# Patient Record
Sex: MALE | Race: OTHER | Employment: FULL TIME | URBAN - METROPOLITAN AREA
[De-identification: names, ages, dates, MRNs, and addresses within clinical notes are randomized per-mention and may not be internally consistent; named-entity substitution may affect disease eponyms.]

---

## 2017-02-27 ENCOUNTER — OFFICE VISIT (OUTPATIENT)
Dept: NEUROLOGY | Facility: CLINIC | Age: 47
End: 2017-02-27

## 2017-02-27 VITALS
DIASTOLIC BLOOD PRESSURE: 82 MMHG | HEIGHT: 69 IN | SYSTOLIC BLOOD PRESSURE: 130 MMHG | BODY MASS INDEX: 31.4 KG/M2 | RESPIRATION RATE: 16 BRPM | WEIGHT: 212 LBS

## 2017-02-27 DIAGNOSIS — D32.9 MENINGIOMA (HCC): ICD-10-CM

## 2017-02-27 DIAGNOSIS — I63.411 CEREBROVASCULAR ACCIDENT (CVA) DUE TO EMBOLISM OF RIGHT MIDDLE CEREBRAL ARTERY (HCC): Primary | ICD-10-CM

## 2017-02-27 PROCEDURE — 99213 OFFICE O/P EST LOW 20 MIN: CPT | Performed by: OTHER

## 2017-02-27 NOTE — PATIENT INSTRUCTIONS
Refill policies:    • Allow 2 business days for refills; controlled substances may take longer. • Contact our office at least 5 days prior to running out of medication or submit request through the “request refill” option in your [employer] account.   • Ref have a procedure or additional testing performed. [facility]) will contact your insurance carrier to obtain pre-certification or prior authorization.     Unfortunately, JERONIMO has seen an increase in denial of payment even though the p

## 2017-02-27 NOTE — PROGRESS NOTES
[address] [facility] : 1970     HPI:   Patient presents with:  TIA: Pt in for follow-up last seen on 2016. Pt denies complaints, has been stable. Adelso JennyMarcellShore was seen for follow-up in my office 2017.   He is a 46-y APPROACH  04/30/2012    Comment scanned to media tab      Family History   Problem Relation Age of Onset   • Cancer Father      Per NG:  Lung - cancer; Cause of death   • Diabetes Maternal Aunt       Social History:  Social History    Marital Status: Svetlana corneal reflex. VII- face symmetric. VIII- Auditory acuity symmetric. Motor: 5/5 strength in the upper and lower extremities. Tone normal. No pronator drift .   DTR: 2+ in the upper and lower extremities,  No Babinski, no hoffmans, no clonus  Coordinatio

## 2017-04-06 ENCOUNTER — LAB ENCOUNTER (OUTPATIENT)
Dept: LAB | Facility: HOSPITAL | Age: 47
End: 2017-04-06
Attending: INTERNAL MEDICINE
Payer: COMMERCIAL

## 2017-04-06 DIAGNOSIS — I10 HYPERTENSION: Primary | ICD-10-CM

## 2017-04-06 DIAGNOSIS — I48.91 ATRIAL FIBRILLATION (HCC): ICD-10-CM

## 2017-04-06 PROCEDURE — 84450 TRANSFERASE (AST) (SGOT): CPT

## 2017-04-06 PROCEDURE — 36415 COLL VENOUS BLD VENIPUNCTURE: CPT

## 2017-04-06 PROCEDURE — 80061 LIPID PANEL: CPT

## 2017-04-06 PROCEDURE — 84460 ALANINE AMINO (ALT) (SGPT): CPT

## 2017-04-06 PROCEDURE — 80048 BASIC METABOLIC PNL TOTAL CA: CPT

## 2017-04-12 ENCOUNTER — HOSPITAL ENCOUNTER (OUTPATIENT)
Dept: CV DIAGNOSTICS | Facility: HOSPITAL | Age: 47
Discharge: HOME | End: 2017-04-12
Attending: INTERNAL MEDICINE
Payer: COMMERCIAL

## 2017-04-12 DIAGNOSIS — I63.119 CEREBROVASCULAR ACCIDENT (CVA) DUE TO EMBOLISM OF VERTEBRAL ARTERY, UNSPECIFIED BLOOD VESSEL LATERALITY (HCC): ICD-10-CM

## 2017-04-12 DIAGNOSIS — I48.19 PERSISTENT ATRIAL FIBRILLATION (HCC): Chronic | ICD-10-CM

## 2017-04-12 PROCEDURE — 93306 TTE W/DOPPLER COMPLETE: CPT

## 2017-04-12 PROCEDURE — 93306 TTE W/DOPPLER COMPLETE: CPT | Performed by: INTERNAL MEDICINE

## 2017-05-17 ENCOUNTER — OFFICE VISIT (OUTPATIENT)
Dept: CARDIOLOGY CLINIC | Facility: CLINIC | Age: 47
End: 2017-05-17

## 2017-05-17 VITALS
SYSTOLIC BLOOD PRESSURE: 150 MMHG | HEART RATE: 94 BPM | HEIGHT: 69 IN | WEIGHT: 220 LBS | BODY MASS INDEX: 32.58 KG/M2 | RESPIRATION RATE: 18 BRPM | DIASTOLIC BLOOD PRESSURE: 109 MMHG

## 2017-05-17 DIAGNOSIS — I63.119 CEREBROVASCULAR ACCIDENT (CVA) DUE TO EMBOLISM OF VERTEBRAL ARTERY, UNSPECIFIED BLOOD VESSEL LATERALITY (HCC): ICD-10-CM

## 2017-05-17 DIAGNOSIS — I48.19 PERSISTENT ATRIAL FIBRILLATION (HCC): Primary | Chronic | ICD-10-CM

## 2017-05-17 DIAGNOSIS — I10 ESSENTIAL HYPERTENSION: Chronic | ICD-10-CM

## 2017-05-17 PROCEDURE — 99214 OFFICE O/P EST MOD 30 MIN: CPT | Performed by: INTERNAL MEDICINE

## 2017-05-17 PROCEDURE — 99212 OFFICE O/P EST SF 10 MIN: CPT | Performed by: INTERNAL MEDICINE

## 2017-05-17 RX ORDER — METOPROLOL SUCCINATE 100 MG/1
100 TABLET, EXTENDED RELEASE ORAL DAILY
Qty: 30 TABLET | Refills: 5 | Status: SHIPPED | OUTPATIENT
Start: 2017-05-17 | End: 2018-01-24

## 2017-05-17 NOTE — PATIENT INSTRUCTIONS
Increase Toprol-XL from 50 mg to 100 mg once a day. Continue rest of the medications. Hold atorvastatin for 2 weeks, if relief in joint pains call my office for a substitute medication.   If no change in joint pains after 2 weeks restart atorvastatin at 4

## 2017-05-17 NOTE — PROGRESS NOTES
Titi Joyner is a 40year old male. Patient presents with: Follow - Up    HPI:   Patient is here for a follow-up appointment.   He has history of chronic atrial fibrillation, CVA and LV dysfunction in the past which is recovered to 50% on the last Pulse 94  Resp 18  Ht 5' 9\" (1.753 m)  Wt 220 lb (99.791 kg)  BMI 32.47 kg/m2  GENERAL: well developed, well nourished,in no apparent distress  SKIN: no rashes,no suspicious lesions  HEENT: atraumatic, normocephalic,ears and throat are clear  NECK: supple

## 2017-05-26 ENCOUNTER — APPOINTMENT (OUTPATIENT)
Dept: LAB | Age: 47
End: 2017-05-26
Attending: FAMILY MEDICINE
Payer: COMMERCIAL

## 2017-05-26 ENCOUNTER — OFFICE VISIT (OUTPATIENT)
Dept: FAMILY MEDICINE CLINIC | Facility: CLINIC | Age: 47
End: 2017-05-26

## 2017-05-26 VITALS
BODY MASS INDEX: 32.76 KG/M2 | DIASTOLIC BLOOD PRESSURE: 85 MMHG | RESPIRATION RATE: 18 BRPM | WEIGHT: 221.19 LBS | SYSTOLIC BLOOD PRESSURE: 124 MMHG | TEMPERATURE: 98 F | HEART RATE: 79 BPM | HEIGHT: 69 IN

## 2017-05-26 DIAGNOSIS — R36.1 HEMATOSPERMIA: ICD-10-CM

## 2017-05-26 DIAGNOSIS — M99.02 THORACIC REGION SOMATIC DYSFUNCTION: ICD-10-CM

## 2017-05-26 DIAGNOSIS — T50.905A MEDICATION SIDE EFFECT, INITIAL ENCOUNTER: ICD-10-CM

## 2017-05-26 DIAGNOSIS — E78.5 HYPERLIPIDEMIA, UNSPECIFIED HYPERLIPIDEMIA TYPE: ICD-10-CM

## 2017-05-26 DIAGNOSIS — I48.19 PERSISTENT ATRIAL FIBRILLATION (HCC): Primary | Chronic | ICD-10-CM

## 2017-05-26 PROCEDURE — 98927 OSTEOPATH MANJ 5-6 REGIONS: CPT | Performed by: FAMILY MEDICINE

## 2017-05-26 PROCEDURE — 87491 CHLMYD TRACH DNA AMP PROBE: CPT

## 2017-05-26 PROCEDURE — 87086 URINE CULTURE/COLONY COUNT: CPT

## 2017-05-26 PROCEDURE — 99214 OFFICE O/P EST MOD 30 MIN: CPT | Performed by: FAMILY MEDICINE

## 2017-05-26 PROCEDURE — 81003 URINALYSIS AUTO W/O SCOPE: CPT

## 2017-05-26 PROCEDURE — 87591 N.GONORRHOEAE DNA AMP PROB: CPT

## 2017-05-26 RX ORDER — DOXYCYCLINE HYCLATE 100 MG/1
100 CAPSULE ORAL 2 TIMES DAILY
Qty: 14 CAPSULE | Refills: 0 | Status: SHIPPED | OUTPATIENT
Start: 2017-05-26 | End: 2017-06-02

## 2017-05-26 NOTE — PROGRESS NOTES
HPI:    Patient ID: Sandra Srivastava is a 40year old male. Chest Pain   This is a new problem. The current episode started more than 1 month ago. The onset quality is gradual. The problem occurs intermittently.  The problem has been waxing and wan Allergies:No Known Allergies     05/26/17  1310   BP: 124/85   Pulse: 79   Temp: 97.9 °F (36.6 °C)   TempSrc: Oral   Resp: 18   Height: 5' 9\" (1.753 m)   Weight: 221 lb 3.2 oz (100.336 kg)         Body mass index is 32.65 kg/(m^2).       PHYSICAL EXAM:   P Empiric treatment and assessment for UTI and STD with doxycycline. Patient counseled on the importance of abstinence and if sex occurs of any type condoms should be used every single time.  The reality of unwanted pregnancy and all STD including HIV were em

## 2017-05-26 NOTE — PATIENT INSTRUCTIONS
Empiric treatment and assessment for UTI and STD with doxycycline. Patient counseled on the importance of abstinence and if sex occurs of any type condoms should be used every single time.  The reality of unwanted pregnancy and all STD including HIV were em

## 2017-06-08 ENCOUNTER — TELEPHONE (OUTPATIENT)
Dept: CARDIOLOGY CLINIC | Facility: CLINIC | Age: 47
End: 2017-06-08

## 2017-06-08 NOTE — TELEPHONE ENCOUNTER
Metoprolol Succinate  MG Oral Tablet 24 Hr, Take 1 tablet (100 mg total) by mouth daily. , Disp: 30 tablet, Rfl: 5

## 2017-08-05 RX ORDER — METOPROLOL SUCCINATE 100 MG/1
100 TABLET, EXTENDED RELEASE ORAL DAILY
Qty: 30 TABLET | Refills: 5 | Status: CANCELLED
Start: 2017-08-05

## 2017-08-07 NOTE — TELEPHONE ENCOUNTER
From: Milagros May  Sent: 8/5/2017 4:37 PM CDT  Subject: Medication Renewal Request    Adelso Perez would like a refill of the following medications:  Metoprolol Succinate  MG Oral Tablet 24 Hr Ophelia Alamo MD]    Preferred ph

## 2017-08-30 NOTE — TELEPHONE ENCOUNTER
From: Jasmine Goff  Sent: 8/29/2017 8:29 PM CDT  Subject: Medication Renewal Request    Adelso Jones would like a refill of the following medications:  apixaban (ELIQUIS) 5 MG Oral Tab Chon Black MD]    Preferred pharmacy: Other -

## 2017-09-13 ENCOUNTER — OFFICE VISIT (OUTPATIENT)
Dept: NEUROLOGY | Facility: CLINIC | Age: 47
End: 2017-09-13

## 2017-09-13 ENCOUNTER — TELEPHONE (OUTPATIENT)
Dept: NEUROLOGY | Facility: CLINIC | Age: 47
End: 2017-09-13

## 2017-09-13 VITALS
HEART RATE: 68 BPM | DIASTOLIC BLOOD PRESSURE: 80 MMHG | WEIGHT: 218 LBS | SYSTOLIC BLOOD PRESSURE: 120 MMHG | HEIGHT: 69 IN | BODY MASS INDEX: 32.29 KG/M2 | RESPIRATION RATE: 16 BRPM

## 2017-09-13 DIAGNOSIS — I63.411 CEREBROVASCULAR ACCIDENT (CVA) DUE TO EMBOLISM OF RIGHT MIDDLE CEREBRAL ARTERY (HCC): Primary | ICD-10-CM

## 2017-09-13 DIAGNOSIS — M54.81 BILATERAL OCCIPITAL NEURALGIA: ICD-10-CM

## 2017-09-13 DIAGNOSIS — D32.9 MENINGIOMA (HCC): ICD-10-CM

## 2017-09-13 PROCEDURE — 99213 OFFICE O/P EST LOW 20 MIN: CPT | Performed by: OTHER

## 2017-09-13 NOTE — TELEPHONE ENCOUNTER
[employer] for authorization of approval for MRI brain w/wo. Approval was given with Authorization # [id number] effective 09/13/17 to 10/12/17. Will call Pt. to inform. L/m advising of MRI approval. Can proceed with scheduling appt.

## 2017-09-13 NOTE — PATIENT INSTRUCTIONS
As of October 6th 2014, the Drug Enforcement Agency [facility]) is reclassifying all hydrocodone combination medications from Schedule III to Schedule II. This includes medications such as Norco, Vicodin, Lortab, Zohydro, and Vicoprofen.     What this means for y

## 2017-09-13 NOTE — PROGRESS NOTES
Adelso Shore : 1970     HPI:   Patient presents with:  Neurologic Problem: LOV 17 for follow up after right cerebral infarct 2016.  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 No rash or blisters over his scalp. CV: No  Evidence of Carotid Bruits. Pulse is irregular.   Neuro:  Higher Integrative Functions:  Alert and cooperative, with normal

## 2017-09-28 ENCOUNTER — HOSPITAL ENCOUNTER (OUTPATIENT)
Dept: MRI IMAGING | Facility: HOSPITAL | Age: 47
Discharge: HOME | End: 2017-09-28
Attending: Other
Payer: COMMERCIAL

## 2017-09-28 DIAGNOSIS — I63.411 CEREBROVASCULAR ACCIDENT (CVA) DUE TO EMBOLISM OF RIGHT MIDDLE CEREBRAL ARTERY (HCC): ICD-10-CM

## 2017-09-28 DIAGNOSIS — D32.9 MENINGIOMA (HCC): ICD-10-CM

## 2017-09-28 DIAGNOSIS — M54.81 BILATERAL OCCIPITAL NEURALGIA: ICD-10-CM

## 2017-09-28 PROCEDURE — 80048 BASIC METABOLIC PNL TOTAL CA: CPT

## 2017-09-28 PROCEDURE — 36415 COLL VENOUS BLD VENIPUNCTURE: CPT

## 2017-09-28 PROCEDURE — A9575 INJ GADOTERATE MEGLUMI 0.1ML: HCPCS | Performed by: OTHER

## 2017-09-28 PROCEDURE — 85652 RBC SED RATE AUTOMATED: CPT

## 2017-09-28 PROCEDURE — 70553 MRI BRAIN STEM W/O & W/DYE: CPT | Performed by: OTHER

## 2017-09-28 PROCEDURE — 82565 ASSAY OF CREATININE: CPT

## 2017-10-05 ENCOUNTER — TELEPHONE (OUTPATIENT)
Dept: NEUROLOGY | Facility: CLINIC | Age: 47
End: 2017-10-05

## 2017-11-02 ENCOUNTER — PATIENT MESSAGE (OUTPATIENT)
Dept: CARDIOLOGY CLINIC | Facility: CLINIC | Age: 47
End: 2017-11-02

## 2017-11-02 RX ORDER — DILTIAZEM HYDROCHLORIDE 240 MG/1
240 CAPSULE, COATED, EXTENDED RELEASE ORAL
Qty: 90 CAPSULE | Refills: 0 | Status: SHIPPED | OUTPATIENT
Start: 2017-11-02 | End: 2017-11-14

## 2017-11-02 NOTE — TELEPHONE ENCOUNTER
From: Fadia Can  To: Edil Car MD  Sent: 11/2/2017 3:01 PM CDT  Subject: Prescription Question    Hi, the medication that I need refill is DilTIAZem HCl ER Coated Beads 240 MG Cp24.  Thanks    Adelso

## 2017-11-02 NOTE — TELEPHONE ENCOUNTER
From: Litzy Cheung  To: Fern Borges MD  Sent: 11/2/2017 12:17 AM CDT  Subject: Prescription Question    I am running out of any refill for the \"DilTIAZem HCl ER Coated Beads 240 MG Cp24\", could you please approved the refill for this med

## 2017-11-02 NOTE — TELEPHONE ENCOUNTER
From: [address]  Sent: 11/2/2017 12:22 AM CDT  Subject: Medication Renewal Request    Adelso Bañuelos would like a refill of the following medications:     apixaban (ELIQUIS) 5 MG Oral Tab Trace Oates MD]   Patient Comment: I am

## 2017-11-15 RX ORDER — DILTIAZEM HYDROCHLORIDE 240 MG/1
240 CAPSULE, COATED, EXTENDED RELEASE ORAL
Qty: 90 CAPSULE | Refills: 0 | Status: SHIPPED
Start: 2017-11-15 | End: 2018-01-24

## 2017-11-15 NOTE — TELEPHONE ENCOUNTER
From: Adelso Shore  Sent: 11/14/2017 6:25 PM CST  Subject: Medication Renewal Request    Adelso Henderson would like a refill of the following medications:     DilTIAZem HCl ER Coated Beads 240 MG Oral Capsule SR 24 Hr NICKIE Frankel]    Preferred pharmacy: [facility] , [facility] [phone], [phone]

## 2017-11-16 ENCOUNTER — OFFICE VISIT (OUTPATIENT)
Dept: FAMILY MEDICINE CLINIC | Facility: CLINIC | Age: 47
End: 2017-11-16

## 2017-11-16 VITALS
HEART RATE: 76 BPM | BODY MASS INDEX: 33.33 KG/M2 | SYSTOLIC BLOOD PRESSURE: 112 MMHG | TEMPERATURE: 99 F | RESPIRATION RATE: 16 BRPM | DIASTOLIC BLOOD PRESSURE: 90 MMHG | HEIGHT: 69 IN | WEIGHT: 225 LBS

## 2017-11-16 DIAGNOSIS — K21.9 GASTROESOPHAGEAL REFLUX DISEASE, ESOPHAGITIS PRESENCE NOT SPECIFIED: Primary | ICD-10-CM

## 2017-11-16 DIAGNOSIS — E87.5 HYPERKALEMIA: ICD-10-CM

## 2017-11-16 DIAGNOSIS — I63.9 CEREBROVASCULAR ACCIDENT (CVA), UNSPECIFIED MECHANISM (HCC): ICD-10-CM

## 2017-11-16 PROCEDURE — 99212 OFFICE O/P EST SF 10 MIN: CPT | Performed by: FAMILY MEDICINE

## 2017-11-16 PROCEDURE — 99214 OFFICE O/P EST MOD 30 MIN: CPT | Performed by: FAMILY MEDICINE

## 2017-11-16 NOTE — PROGRESS NOTES
HPI:    Patient ID: Naomi El is a 56year old male. 56year old [place] male with recent history of atrial fibrillation which lead to a CVA without residual effect.  Has recently been re-evaluated by neurology because of head sensations (leigh 76    Resp: 16    Temp: 98.6 °F (37 °C)    TempSrc: Oral    Weight: 225 lb (102.1 kg)    Height: 5' 9\" (1.753 m)          Body mass index is 33.23 kg/m².     PHYSICAL EXAM:   Physical Exam    Constitutional: He is oriented to person, place, and time and ob

## 2017-11-21 ENCOUNTER — APPOINTMENT (OUTPATIENT)
Dept: LAB | Facility: HOSPITAL | Age: 47
End: 2017-11-21
Attending: FAMILY MEDICINE
Payer: COMMERCIAL

## 2017-11-21 DIAGNOSIS — E87.5 HYPERKALEMIA: ICD-10-CM

## 2017-11-21 PROCEDURE — 80048 BASIC METABOLIC PNL TOTAL CA: CPT

## 2017-11-21 PROCEDURE — 36415 COLL VENOUS BLD VENIPUNCTURE: CPT

## 2018-01-25 RX ORDER — DILTIAZEM HYDROCHLORIDE 240 MG/1
240 CAPSULE, COATED, EXTENDED RELEASE ORAL
Qty: 90 CAPSULE | Refills: 0 | Status: SHIPPED
Start: 2018-01-25 | End: 2018-04-18

## 2018-01-25 RX ORDER — METOPROLOL SUCCINATE 100 MG/1
100 TABLET, EXTENDED RELEASE ORAL DAILY
Qty: 30 TABLET | Refills: 2 | Status: SHIPPED
Start: 2018-01-25 | End: 2018-04-18

## 2018-01-25 NOTE — TELEPHONE ENCOUNTER
From: Adelso Shore  Sent: 1/24/2018 6:38 PM CST  Subject: Medication Renewal Request    Adelso Amanda  would like a refill of the following medications:     apixaban (ELIQUIS) 5 MG Oral Tab NICKIE Barr]    Preferred pharmac

## 2018-01-25 NOTE — TELEPHONE ENCOUNTER
Rx request for Eliquis 5 mg, UNABLE to fill per protocol. Please review. Thank you.       Refill Protocol Appointment Criteria  · Appointment scheduled in the past 6 months or in the next 3 months  Recent Outpatient Visits            2 months ago [facility]. [facility] Lutz

## 2018-01-25 NOTE — TELEPHONE ENCOUNTER
From: Adelso Shore  Sent: 1/24/2018 6:38 PM CST  Subject: Medication Renewal Request    Adelso Velazquez would like a refill of the following medications:     Metoprolol Succinate  MG Oral Tablet 24 Hr Chiara Sam MD]     ElaineT

## 2018-01-25 NOTE — TELEPHONE ENCOUNTER
Rx request for Metoprolol Succinate  mg and Diltiazem  mg, filled per protoocol.     Hypertensive Medications  Protocol Criteria:  · Appointment scheduled in the past 6 months or in the next 3 months  · BMP or CMP in the past 12 months  · Creat Ramos [phone] 11/21/2017

## 2018-01-26 ENCOUNTER — TELEPHONE (OUTPATIENT)
Dept: GASTROENTEROLOGY | Facility: CLINIC | Age: 48
End: 2018-01-26

## 2018-01-26 ENCOUNTER — OFFICE VISIT (OUTPATIENT)
Dept: GASTROENTEROLOGY | Facility: CLINIC | Age: 48
End: 2018-01-26

## 2018-01-26 VITALS
SYSTOLIC BLOOD PRESSURE: 138 MMHG | HEART RATE: 58 BPM | BODY MASS INDEX: 33.57 KG/M2 | WEIGHT: 226.63 LBS | HEIGHT: 69 IN | DIASTOLIC BLOOD PRESSURE: 84 MMHG

## 2018-01-26 DIAGNOSIS — R10.13 DYSPEPSIA: ICD-10-CM

## 2018-01-26 DIAGNOSIS — I48.19 PERSISTENT ATRIAL FIBRILLATION (HCC): Primary | Chronic | ICD-10-CM

## 2018-01-26 DIAGNOSIS — K21.9 GASTROESOPHAGEAL REFLUX DISEASE WITHOUT ESOPHAGITIS: ICD-10-CM

## 2018-01-26 PROCEDURE — 99244 OFF/OP CNSLTJ NEW/EST MOD 40: CPT | Performed by: INTERNAL MEDICINE

## 2018-01-26 PROCEDURE — 99212 OFFICE O/P EST SF 10 MIN: CPT | Performed by: INTERNAL MEDICINE

## 2018-01-26 NOTE — H&P
[address] [facility] [address] Gastroenterology                                                                                                  Clinic History and Physical     Pa of Onset   • Cancer Father      Per NG:  Lung - cancer; Cause of death   • Diabetes Maternal Aunt       Social History: Smoking status: Never Smoker                                                              Smokeless tobacco: Never Used perfused   Lung- Moves air well;  No labored breathing  Abdomen- soft, non-tender exam in all quadrants without rigidity or guarding, non-distended, no abnormal bowel sounds noted, no masses are palpated  Skin- No jaundice  Ext: no cyanosis, clubbing or paris

## 2018-01-26 NOTE — TELEPHONE ENCOUNTER
Scheduled for:  EGD 01932  Provider Name: Dr. Anastacia Rizvi  Date:  2/26/18  Location:  [facility]  Sedation:  MAC  Time:  1:15 pm, (pt is aware that [facility] will call the day before to confirm arrival time)  Prep: Clear liquids after midnight, NPO 3 hours prior to procedure

## 2018-01-26 NOTE — PATIENT INSTRUCTIONS
1. Schedule EGD with MAC (in afternoon)   -Continue ALL medications for procedure, including your blood thinner  2. Continue tums as needed  3.  Avoid laying down for 3 hours after dinner

## 2018-02-21 RX ORDER — APIXABAN 5 MG/1
TABLET, FILM COATED ORAL
Qty: 60 TABLET | Refills: 0 | Status: SHIPPED | OUTPATIENT
Start: 2018-02-21 | End: 2018-03-22

## 2018-02-21 NOTE — TELEPHONE ENCOUNTER
Please advise on refill request.  Follow up scheduled 5/2      Recent Outpatient Visits            3 weeks ago Persistent atrial fibrillation [facility])    Anna Oleary MD    Office Visit    3 months ago [facility] Valent[facility] [employer]

## 2018-02-22 ENCOUNTER — TELEPHONE (OUTPATIENT)
Dept: GASTROENTEROLOGY | Facility: CLINIC | Age: 48
End: 2018-02-22

## 2018-02-22 NOTE — TELEPHONE ENCOUNTER
According to pt's appt desk in \"past appointments tab\", pt called on 2/1/18 to cancel EGD procedure that was scheduled for 2/26/18 and did not want to reschedule.      Emailed [phone] informing them of cancellation and pt had already been removed from proced

## 2018-02-26 ENCOUNTER — TELEPHONE (OUTPATIENT)
Dept: CARDIOLOGY CLINIC | Facility: CLINIC | Age: 48
End: 2018-02-26

## 2018-02-26 NOTE — TELEPHONE ENCOUNTER
Noted, appt is scheduled   Future Appointments  Date Time Provider Sagar Rinaldi   5/2/2018 8:30 AM Allen Gonzalez MD [facility] Cari     Pt advised to call back when he needs a refill   Refill was just sent on 2/21

## 2018-02-26 NOTE — TELEPHONE ENCOUNTER
Pt states per pharmacy he will not receive refill for Rx ELIQUIS 5 MG Oral Tab next month until appt has been made. Pt  Has appt scheduled with PP on 5/2/18.  Please call thank you [phone]          Current Outpatient Prescriptions:   •  ELIQUIS 5 MG Or

## 2018-03-25 RX ORDER — APIXABAN 5 MG/1
TABLET, FILM COATED ORAL
Qty: 60 TABLET | Refills: 0 | Status: SHIPPED | OUTPATIENT
Start: 2018-03-25 | End: 2018-04-18

## 2018-04-18 NOTE — TELEPHONE ENCOUNTER
From: Adelso Shore  Sent: 4/18/2018 4:16 PM CDT  Subject: Medication Renewal Request    Adelso Casanovaute Teixeira would like a refill of the following medications:     Metoprolol Succinate  MG Oral Tablet 24 Hr Shanel Rueda MD]   Kacey

## 2018-04-18 NOTE — TELEPHONE ENCOUNTER
From: Adelso Shore  Sent: 4/18/2018 4:16 PM CDT  Subject: Medication Renewal Request    Adelso Willett would like a refill of the following medications:     ELIQUIS 5 MG Oral Tab NICKIE Mcgowan]   Patient Comment: My appointme

## 2018-04-22 RX ORDER — DILTIAZEM HYDROCHLORIDE 240 MG/1
240 CAPSULE, COATED, EXTENDED RELEASE ORAL
Qty: 30 CAPSULE | Refills: 0 | Status: SHIPPED
Start: 2018-04-22 | End: 2018-05-02

## 2018-04-22 RX ORDER — METOPROLOL SUCCINATE 100 MG/1
100 TABLET, EXTENDED RELEASE ORAL DAILY
Qty: 30 TABLET | Refills: 0 | Status: SHIPPED
Start: 2018-04-22 | End: 2018-05-02

## 2018-04-26 ENCOUNTER — PATIENT MESSAGE (OUTPATIENT)
Dept: CARDIOLOGY CLINIC | Facility: CLINIC | Age: 48
End: 2018-04-26

## 2018-04-26 NOTE — TELEPHONE ENCOUNTER
From: Lizzeth Earl  To: Ara Ramírez MD  Sent: 4/26/2018 12:09 AM CDT  Subject: Prescription Question    I have the doctor appointment on May 2, i requested to get a refill for Eliquis, [facility] pharmacy said it was denied.  Please approved refil

## 2018-05-02 ENCOUNTER — OFFICE VISIT (OUTPATIENT)
Dept: CARDIOLOGY CLINIC | Facility: CLINIC | Age: 48
End: 2018-05-02

## 2018-05-02 VITALS
HEART RATE: 56 BPM | DIASTOLIC BLOOD PRESSURE: 76 MMHG | SYSTOLIC BLOOD PRESSURE: 124 MMHG | BODY MASS INDEX: 33 KG/M2 | WEIGHT: 224 LBS | RESPIRATION RATE: 16 BRPM

## 2018-05-02 DIAGNOSIS — I63.119 CEREBROVASCULAR ACCIDENT (CVA) DUE TO EMBOLISM OF VERTEBRAL ARTERY, UNSPECIFIED BLOOD VESSEL LATERALITY (HCC): ICD-10-CM

## 2018-05-02 DIAGNOSIS — I10 ESSENTIAL HYPERTENSION: Chronic | ICD-10-CM

## 2018-05-02 DIAGNOSIS — I48.0 PAROXYSMAL ATRIAL FIBRILLATION (HCC): Primary | Chronic | ICD-10-CM

## 2018-05-02 PROCEDURE — 99212 OFFICE O/P EST SF 10 MIN: CPT | Performed by: INTERNAL MEDICINE

## 2018-05-02 PROCEDURE — 99214 OFFICE O/P EST MOD 30 MIN: CPT | Performed by: INTERNAL MEDICINE

## 2018-05-02 RX ORDER — METOPROLOL SUCCINATE 100 MG/1
100 TABLET, EXTENDED RELEASE ORAL DAILY
Qty: 90 TABLET | Refills: 3 | Status: SHIPPED | OUTPATIENT
Start: 2018-05-02 | End: 2018-05-30

## 2018-05-02 RX ORDER — ATORVASTATIN CALCIUM 40 MG/1
40 TABLET, FILM COATED ORAL
Refills: 0 | Status: CANCELLED | OUTPATIENT
Start: 2018-05-02

## 2018-05-02 RX ORDER — ROSUVASTATIN CALCIUM 20 MG/1
10 TABLET, COATED ORAL NIGHTLY
Qty: 30 TABLET | Refills: 5 | Status: SHIPPED | OUTPATIENT
Start: 2018-05-02 | End: 2018-05-30

## 2018-05-02 RX ORDER — DILTIAZEM HYDROCHLORIDE 240 MG/1
240 CAPSULE, COATED, EXTENDED RELEASE ORAL
Qty: 90 CAPSULE | Refills: 3 | Status: SHIPPED | OUTPATIENT
Start: 2018-05-02 | End: 2018-05-30

## 2018-05-02 NOTE — PROGRESS NOTES
Mini Juarez is a 56year old male. Patient presents with: Follow - Up: Wt. gain, Feels like brain is itching    HPI:   Patient is here for follow-up appointment.   He has history of atrial fibrillation and CVA in the past.  He denies any sympt well developed, well nourished,in no apparent distress  SKIN: no rashes,no suspicious lesions  HEENT: atraumatic, normocephalic,ears and throat are clear  NECK: supple,no adenopathy,no bruits  LUNGS: clear to auscultation  CARDIO: regular rate and rhythm

## 2018-05-02 NOTE — PATIENT INSTRUCTIONS
Start taking Crestor 5 mg every other day for a month and if no symptoms after the month start taking it every day. Continue rest of the medications. Blood test within the next few weeks. Follow-up with me in 12 months or sooner if needed.

## 2018-05-16 ENCOUNTER — HOSPITAL ENCOUNTER (OUTPATIENT)
Age: 48
Discharge: HOME | End: 2018-05-16
Attending: PEDIATRICS
Payer: COMMERCIAL

## 2018-05-16 VITALS
TEMPERATURE: 98 F | DIASTOLIC BLOOD PRESSURE: 86 MMHG | RESPIRATION RATE: 16 BRPM | HEART RATE: 96 BPM | SYSTOLIC BLOOD PRESSURE: 158 MMHG

## 2018-05-16 DIAGNOSIS — M76.62 ACHILLES TENDONITIS, BILATERAL: Primary | ICD-10-CM

## 2018-05-16 DIAGNOSIS — M76.61 ACHILLES TENDONITIS, BILATERAL: Primary | ICD-10-CM

## 2018-05-16 PROCEDURE — 99214 OFFICE O/P EST MOD 30 MIN: CPT

## 2018-05-16 PROCEDURE — 99213 OFFICE O/P EST LOW 20 MIN: CPT

## 2018-05-16 RX ORDER — PREDNISONE 20 MG/1
40 TABLET ORAL DAILY
Qty: 6 TABLET | Refills: 0 | Status: SHIPPED | OUTPATIENT
Start: 2018-05-16 | End: 2018-05-19

## 2018-05-16 NOTE — ED PROVIDER NOTES
Patient Seen in: [facility] In [address] [place] [address]    History   Patient presents with:  Lower Extremity Injury (musculoskeletal)    Stated Complaint: Nhan Leg Pain    HPI    HPI: Suze Costa is a 56year old male who presents with CHOLECYSTECTOMY  2007: COLECTOMY  2007: COLECTOMY  04/30/2012: MEDIASTINOSCOPY, CERV APPROACH      Comment: scanned to media tab    Medications :   predniSONE 20 MG Oral Tab,  Take 2 tablets (40 mg total) by mouth daily.    DilTIAZem HCl ER Coated Beads 240 There is no ligamentous instability to anterior drawer. There is no notable deformity. Mild warmth and erythema noted, no skin breakdown. No proximal tib fib tenderness. The foot and toes are warm and well-perfused. NEURO:Sensation to touch is intact.

## 2018-05-16 NOTE — ED INITIAL ASSESSMENT (HPI)
c/o pain and swelling both feet since Sunday. Pain starts from both ankles. Claims that Lt foot swelling is almost resolved  Denies trauma.

## 2018-05-28 RX ORDER — METOPROLOL SUCCINATE 100 MG/1
100 TABLET, EXTENDED RELEASE ORAL DAILY
Qty: 90 TABLET | Refills: 3
Start: 2018-05-28

## 2018-05-28 RX ORDER — DILTIAZEM HYDROCHLORIDE 240 MG/1
240 CAPSULE, COATED, EXTENDED RELEASE ORAL
Qty: 90 CAPSULE | Refills: 3
Start: 2018-05-28

## 2018-05-28 RX ORDER — ROSUVASTATIN CALCIUM 20 MG/1
10 TABLET, COATED ORAL NIGHTLY
Qty: 30 TABLET | Refills: 5
Start: 2018-05-28

## 2018-05-30 NOTE — TELEPHONE ENCOUNTER
From: Adelso Shore  Sent: 5/30/2018 7:36 AM CDT  Subject: Medication Renewal Request    Adelso Palma would like a refill of the following medications:     DilTIAZem HCl ER Coated Beads 240 MG Oral Capsule SR 24 Jarrett Cogan, MD

## 2018-05-30 NOTE — TELEPHONE ENCOUNTER
LOV 5/2/18 PP follow up 1 yr    Diltiazem  MG tab daily 90 tabs w/3 refills pended    Metoprolol Succinate  mg tab  Daily 90 tabs w/3 refills pended    Apixaban 5 mg tab Bid 180 tabs w/3 refills pended    Rosuvastatin Calcium 20 mg tab 1/2 tab

## 2018-06-01 RX ORDER — METOPROLOL SUCCINATE 100 MG/1
100 TABLET, EXTENDED RELEASE ORAL DAILY
Qty: 90 TABLET | Refills: 3 | Status: SHIPPED
Start: 2018-06-01 | End: 2019-04-10

## 2018-06-01 RX ORDER — ROSUVASTATIN CALCIUM 20 MG/1
10 TABLET, COATED ORAL NIGHTLY
Qty: 30 TABLET | Refills: 5 | Status: SHIPPED
Start: 2018-06-01 | End: 2019-04-10

## 2018-06-01 RX ORDER — DILTIAZEM HYDROCHLORIDE 240 MG/1
240 CAPSULE, COATED, EXTENDED RELEASE ORAL
Qty: 90 CAPSULE | Refills: 3 | Status: SHIPPED
Start: 2018-06-01 | End: 2019-04-10

## 2018-06-05 RX ORDER — DILTIAZEM HYDROCHLORIDE 240 MG/1
240 CAPSULE, COATED, EXTENDED RELEASE ORAL
Qty: 30 CAPSULE | Refills: 0 | Status: SHIPPED | OUTPATIENT
Start: 2018-06-05 | End: 2019-04-10

## 2018-06-12 ENCOUNTER — APPOINTMENT (OUTPATIENT)
Dept: LAB | Age: 48
End: 2018-06-12
Attending: FAMILY MEDICINE
Payer: COMMERCIAL

## 2018-06-12 ENCOUNTER — OFFICE VISIT (OUTPATIENT)
Dept: FAMILY MEDICINE CLINIC | Facility: CLINIC | Age: 48
End: 2018-06-12

## 2018-06-12 VITALS
HEIGHT: 69 IN | RESPIRATION RATE: 16 BRPM | DIASTOLIC BLOOD PRESSURE: 90 MMHG | HEART RATE: 93 BPM | SYSTOLIC BLOOD PRESSURE: 110 MMHG | TEMPERATURE: 98 F

## 2018-06-12 DIAGNOSIS — M79.672 FOOT PAIN, BILATERAL: ICD-10-CM

## 2018-06-12 DIAGNOSIS — M79.672 FOOT PAIN, BILATERAL: Primary | ICD-10-CM

## 2018-06-12 DIAGNOSIS — M72.2 PLANTAR FASCIITIS: ICD-10-CM

## 2018-06-12 DIAGNOSIS — M79.671 FOOT PAIN, BILATERAL: Primary | ICD-10-CM

## 2018-06-12 DIAGNOSIS — M79.671 FOOT PAIN, BILATERAL: ICD-10-CM

## 2018-06-12 PROCEDURE — 99212 OFFICE O/P EST SF 10 MIN: CPT | Performed by: FAMILY MEDICINE

## 2018-06-12 PROCEDURE — 99214 OFFICE O/P EST MOD 30 MIN: CPT | Performed by: FAMILY MEDICINE

## 2018-06-12 RX ORDER — PREDNISONE 20 MG/1
40 TABLET ORAL
Refills: 0 | COMMUNITY
Start: 2018-05-16 | End: 2018-06-12 | Stop reason: ALTCHOICE

## 2018-06-12 NOTE — PATIENT INSTRUCTIONS
Medication reviewed and renewed where needed and appropriate. Comply with medications. Monitor blood pressures and record at home. Limit salt intake. Recommend weight loss via daily exercising and consistent healthy dietary changes. HOLLY and RF ordered.

## 2018-06-13 NOTE — PROGRESS NOTES
HPI:    Patient ID: Litzy Cheung is a 53year old male. Foot Pain    The pain is present in the right foot and left foot. This is a recurrent problem. The current episode started more than 1 month ago.  There has been no history of extremity rate and regular rhythm. Exam reveals no gallop. Pulses:       Dorsalis pedis pulses are 2+ on the right side, and 2+ on the left side. Carotid bruit not present. Pulmonary/Chest: Effort normal and breath sounds normal. No respiratory distress.    Musc

## 2018-06-14 ENCOUNTER — APPOINTMENT (OUTPATIENT)
Dept: LAB | Facility: HOSPITAL | Age: 48
End: 2018-06-14
Attending: INTERNAL MEDICINE
Payer: COMMERCIAL

## 2018-06-14 ENCOUNTER — PRIOR ORIGINAL RECORDS (OUTPATIENT)
Dept: OTHER | Age: 48
End: 2018-06-14

## 2018-06-14 DIAGNOSIS — I48.0 PAROXYSMAL ATRIAL FIBRILLATION (HCC): Chronic | ICD-10-CM

## 2018-06-14 DIAGNOSIS — I10 ESSENTIAL HYPERTENSION: Chronic | ICD-10-CM

## 2018-06-14 PROCEDURE — 80061 LIPID PANEL: CPT

## 2018-06-14 PROCEDURE — 80048 BASIC METABOLIC PNL TOTAL CA: CPT

## 2018-06-14 PROCEDURE — 36415 COLL VENOUS BLD VENIPUNCTURE: CPT

## 2018-06-15 LAB
BUN: 12 MG/DL
CHLORIDE: 105 MEQ/L
CHOLESTEROL, TOTAL: 214 MG/DL
CREATININE, SERUM: 9.5 MG/DL
GLUCOSE: 201 MG/DL
GLUCOSE: 201 MG/DL
HDL CHOLESTEROL: 34 MG/DL
LDL CHOLESTEROL: 134 MG/DL
POTASSIUM, SERUM: 4.4 MEQ/L
SODIUM: 138 MEQ/L
TRIGLYCERIDES: 230 MG/DL

## 2018-06-21 ENCOUNTER — TELEPHONE (OUTPATIENT)
Dept: CARDIOLOGY CLINIC | Facility: CLINIC | Age: 48
End: 2018-06-21

## 2018-06-21 NOTE — TELEPHONE ENCOUNTER
Spoke with Mr Ron Riggins about his labs and asked if he is taking his Crestor. He admits to not starting this medication because he was having a foot issue at that time and thought the medicine would make it worse.  He has appointment with podiatrist in [place]

## 2018-07-10 ENCOUNTER — OFFICE VISIT (OUTPATIENT)
Dept: PODIATRY CLINIC | Facility: CLINIC | Age: 48
End: 2018-07-10

## 2018-07-10 DIAGNOSIS — M10.079 ACUTE IDIOPATHIC GOUT OF FOOT, UNSPECIFIED LATERALITY: Primary | ICD-10-CM

## 2018-07-10 PROCEDURE — 99212 OFFICE O/P EST SF 10 MIN: CPT | Performed by: PODIATRIST

## 2018-07-10 PROCEDURE — 99203 OFFICE O/P NEW LOW 30 MIN: CPT | Performed by: PODIATRIST

## 2018-07-11 NOTE — PROGRESS NOTES
HPI:    Patient ID: Maureen Miguel is a 53year old male. HPI  This pleasant 46-year-old male presents as a new patient to me on consult from [address] [place] [address].   Patient states that he is here for an evaluation and consideration of pain that he is Physical Exam  On physical exam pedal pulses are noted. There is no obvious clinical deformity on either side. Capillary filling time is normal his feet are warm to touch. Skin texture is good hair growth is noted on his toes.   There is no evidence of

## 2019-04-10 ENCOUNTER — OFFICE VISIT (OUTPATIENT)
Dept: CARDIOLOGY CLINIC | Facility: CLINIC | Age: 49
End: 2019-04-10
Payer: COMMERCIAL

## 2019-04-10 VITALS
BODY MASS INDEX: 33.92 KG/M2 | DIASTOLIC BLOOD PRESSURE: 86 MMHG | RESPIRATION RATE: 18 BRPM | HEART RATE: 64 BPM | HEIGHT: 69 IN | WEIGHT: 229 LBS | SYSTOLIC BLOOD PRESSURE: 124 MMHG

## 2019-04-10 DIAGNOSIS — I48.0 PAROXYSMAL ATRIAL FIBRILLATION (HCC): Primary | ICD-10-CM

## 2019-04-10 DIAGNOSIS — I10 ESSENTIAL HYPERTENSION: ICD-10-CM

## 2019-04-10 DIAGNOSIS — I63.119 CEREBROVASCULAR ACCIDENT (CVA) DUE TO EMBOLISM OF VERTEBRAL ARTERY, UNSPECIFIED BLOOD VESSEL LATERALITY (HCC): ICD-10-CM

## 2019-04-10 PROCEDURE — 99212 OFFICE O/P EST SF 10 MIN: CPT | Performed by: INTERNAL MEDICINE

## 2019-04-10 PROCEDURE — 99214 OFFICE O/P EST MOD 30 MIN: CPT | Performed by: INTERNAL MEDICINE

## 2019-04-10 RX ORDER — DILTIAZEM HYDROCHLORIDE 240 MG/1
240 CAPSULE, COATED, EXTENDED RELEASE ORAL
Qty: 90 CAPSULE | Refills: 3 | Status: SHIPPED | OUTPATIENT
Start: 2019-04-10

## 2019-04-10 RX ORDER — METOPROLOL SUCCINATE 100 MG/1
100 TABLET, EXTENDED RELEASE ORAL DAILY
Qty: 90 TABLET | Refills: 3 | Status: SHIPPED | OUTPATIENT
Start: 2019-04-10

## 2019-04-10 NOTE — PROGRESS NOTES
Briana Lo is a 53year old male. Patient presents with:   Follow - Up  Hyperlipidemia: patient d/c crestor due to muscle pain  Atrial Fibrillation  Hypertension    HPI:   Patient is here for follow-up appointment, annual.  He denies any sympt denies abdominal pain and denies heartburn  NEURO: denies headaches    EXAM:   /86   Pulse 64   Resp 18   Ht 5' 9\" (1.753 m)   Wt 229 lb (103.9 kg)   BMI 33.82 kg/m²   GENERAL: well developed, well nourished,in no apparent distress  SKIN: no rashes,

## 2019-04-10 NOTE — PATIENT INSTRUCTIONS
Continue current medications. Blood test within next few weeks. Follow-up with me in 12 months or sooner if needed.

## 2019-04-23 ENCOUNTER — APPOINTMENT (OUTPATIENT)
Dept: LAB | Facility: HOSPITAL | Age: 49
End: 2019-04-23
Attending: INTERNAL MEDICINE
Payer: COMMERCIAL

## 2019-04-23 DIAGNOSIS — I10 ESSENTIAL HYPERTENSION: ICD-10-CM

## 2019-04-23 DIAGNOSIS — I48.0 PAROXYSMAL ATRIAL FIBRILLATION (HCC): ICD-10-CM

## 2019-04-23 DIAGNOSIS — E78.5 DYSLIPIDEMIA: ICD-10-CM

## 2019-04-23 PROCEDURE — 84450 TRANSFERASE (AST) (SGOT): CPT

## 2019-04-23 PROCEDURE — 84460 ALANINE AMINO (ALT) (SGPT): CPT

## 2019-04-23 PROCEDURE — 80061 LIPID PANEL: CPT

## 2019-04-23 PROCEDURE — 36415 COLL VENOUS BLD VENIPUNCTURE: CPT

## 2019-04-23 PROCEDURE — 80048 BASIC METABOLIC PNL TOTAL CA: CPT

## 2019-06-13 ENCOUNTER — OFFICE VISIT (OUTPATIENT)
Dept: FAMILY MEDICINE CLINIC | Facility: CLINIC | Age: 49
End: 2019-06-13
Payer: COMMERCIAL

## 2019-06-13 VITALS
SYSTOLIC BLOOD PRESSURE: 110 MMHG | BODY MASS INDEX: 34.53 KG/M2 | HEIGHT: 68 IN | WEIGHT: 227.81 LBS | DIASTOLIC BLOOD PRESSURE: 80 MMHG | HEART RATE: 60 BPM | TEMPERATURE: 98 F

## 2019-06-13 DIAGNOSIS — J31.0 RHINITIS, UNSPECIFIED TYPE: ICD-10-CM

## 2019-06-13 DIAGNOSIS — H69.81 EUSTACHIAN TUBE DYSFUNCTION, RIGHT: ICD-10-CM

## 2019-06-13 DIAGNOSIS — Z00.00 ROUTINE PHYSICAL EXAMINATION: Primary | ICD-10-CM

## 2019-06-13 DIAGNOSIS — H93.11 TINNITUS OF RIGHT EAR: ICD-10-CM

## 2019-06-13 PROCEDURE — G0463 HOSPITAL OUTPT CLINIC VISIT: HCPCS | Performed by: FAMILY MEDICINE

## 2019-06-13 PROCEDURE — 99396 PREV VISIT EST AGE 40-64: CPT | Performed by: FAMILY MEDICINE

## 2019-06-13 PROCEDURE — 99214 OFFICE O/P EST MOD 30 MIN: CPT | Performed by: FAMILY MEDICINE

## 2019-06-13 RX ORDER — FLUTICASONE PROPIONATE 50 MCG
2 SPRAY, SUSPENSION (ML) NASAL DAILY
Qty: 3 BOTTLE | Refills: 3 | Status: SHIPPED | OUTPATIENT
Start: 2019-06-13 | End: 2020-06-07

## 2019-06-13 NOTE — PROGRESS NOTES
HPI:    Patient ID: Naomi El is a 51year old male.     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   Left Ear: Ear canal normal.   Nose: Mucosal edema present. Mouth/Throat: Oropharynx is clear and moist.   Bilateral swollen nasal turbinates right side greater than left. There is pharyngeal cobblestoning. appropriate. Comply with medications. Monitor blood pressures and record at home. Limit salt intake. Recommend weight loss via daily exercising and consistent healthy dietary changes.   Encouraged physical fitness and daily physical activity daily (PLAY)

## 2019-06-14 ENCOUNTER — LAB ENCOUNTER (OUTPATIENT)
Dept: LAB | Facility: HOSPITAL | Age: 49
End: 2019-06-14
Attending: FAMILY MEDICINE
Payer: COMMERCIAL

## 2019-06-14 DIAGNOSIS — Z00.00 ROUTINE PHYSICAL EXAMINATION: ICD-10-CM

## 2019-06-14 PROCEDURE — 85025 COMPLETE CBC W/AUTO DIFF WBC: CPT

## 2019-06-14 PROCEDURE — 84443 ASSAY THYROID STIM HORMONE: CPT

## 2019-06-14 PROCEDURE — 80053 COMPREHEN METABOLIC PANEL: CPT

## 2019-06-14 PROCEDURE — 81001 URINALYSIS AUTO W/SCOPE: CPT

## 2019-06-14 PROCEDURE — 85060 BLOOD SMEAR INTERPRETATION: CPT

## 2019-06-14 PROCEDURE — 36415 COLL VENOUS BLD VENIPUNCTURE: CPT

## 2020-02-26 ENCOUNTER — TELEPHONE (OUTPATIENT)
Dept: CARDIOLOGY CLINIC | Facility: CLINIC | Age: 50
End: 2020-02-26

## 2020-02-26 NOTE — TELEPHONE ENCOUNTER
Patient indicates he moved to [facility] and is transferring care to [facility], phone:[phone]. Patient requesting referral in order to be seen as a new patient. Any questions please call patient at:[phone],thanks.

## 2020-03-02 NOTE — TELEPHONE ENCOUNTER
S/w Adelso and he needs a referral for this [place] [facility]. [address] [facility] clinic to verify what is needed. Their fax [phone]. They will fax a referral to us.

## (undated) NOTE — Clinical Note
[address] [facility] [place]  [address], [address]  [address] [place] [address] [phone]        Dear Phani Jeffery, DO,      I had the pleasure of seeing your patient, Deborah Rosenbaum on 2/27/2017.      Below pleas No current facility-administered medications for this visit.    Past Medical History   Diagnosis Date   • Chronic atrial [facility] ([facility]    • High blood pressure    • TIA (transient ischemic attack)  • Persistent atrial fibrillation ([facility] 11/16/2016   • Higher Integrative Functions:  Alert and cooperative, with normal attention span and concentration. Speech and language normal.  Oriented times three. Recent and remote memory intact, with normal fund of knowledge.   Cranial Nerves: II-Visual acuity gross

## (undated) NOTE — MR AVS SNAPSHOT
[facility] SPECIALTY [facility] [place] [address]  [address]  [phone]               Thank you for choosing us for your health care visit with Chon Black MD.  We are glad to serve you and happy to provide you with this summary of y DilTIAZem HCl ER Coated Beads 240 MG Cp24   Take 240 mg by mouth once daily. Commonly known as:  CARDIZEM CD           ELIQUIS 5 MG Tabs   Generic drug:  apixaban   Take 5 mg by mouth 2 (two) times daily.            Metoprolol Succinate  MG

## (undated) NOTE — MR AVS SNAPSHOT
[facility]  [address], [address]  [phone]               Thank you for choosing us for your health care visit with Jenni Sanchez DO.   We are glad to serve you and happy to provide you with this sum 124/85 mmHg 79 97.9 °F (36.6 °C) (Oral) 5' 9\" (1.753 m) 221 lb 3.2 oz (100.336 kg) 32.65 kg/m2         Current Medications          This list is accurate as of: 5/26/17  1:28 PM.  Always use your most recent med list.                atorvastatin 40 MG Ta

## (undated) NOTE — LETTER
July 11, 2018         DO Duane Fowler [address] [id number]      Patient: Berlin Almonte   YOB: 1970   Date of Visit: 7/10/2018       Dear Dr. Gabbie Scott,    I saw your patient, Tanya Murray

## (undated) NOTE — LETTER
[address] [facility]  [address], [address]  [address] [place] [address] [phone]        Dear Gerardo Thorne, ,      I had the pleasure of seeing your patient, Diogo Mukherjee on 9/13/2017.      Below pleas Vitamin B-12 500 MCG Oral Tab Take 1,000 mcg by mouth once daily. Disp:  Rfl: 0   Multiple Vitamins Essential Oral Tab Take  by mouth. Disp:  Rfl:    apixaban (ELIQUIS) 5 MG Oral Tab Take 1 tablet (5 mg total) by mouth 2 (two) times daily.  Disp: 180 tablet RESPIRATORY: denies shortness of breath, wheezing or cough   CARDIOVASCULAR: Atrial fibrillation  GI: denies nausea, vomiting, constipation, diarrhea; no heartburn  GENITAL/: no dysuria, urgency or frequency; no nocturia  MUSCULOSKELETAL: no joint compla compare the size of the meningioma. Will also be an opportunity to rule out any new areas of ischemia. I asked him to call me when the sed rate and MRI results are available. Thank you very much. Return in about 6 months (around 3/13/2018).     Piedad Juarez

## (undated) NOTE — LETTER
6/12/2018              [address] [facility]         Dear Monica Ivan,    Our records indicate that the tests ordered for you by Mejia Booth MD  have not been done.   If you have, in fact, already com

## (undated) NOTE — MR AVS SNAPSHOT
[facility] [employer] [facility]  [address], [address]  [address] [phone]               Thank you for choosing us for your health care visit with Sohail Adair MD, MD.  We are glad to serve you and happy to provide you with this summary o ? By law, narcotics cannot be faxed or phoned into your pharmacy. The prescription must be signed by the provider, picked up in our office and physically brought to the pharmacy. A 30 day supply with no refills is the maximum allowed.   ? If your prescript approved and is a COVERED benefit. Although the [facility] staff does its due diligence, the insurance carrier gives the disclaimer that \"Although the procedure is authorized, this does not guarantee payment. \"    Ultimately the patient is responsible for payme Tips for making healthy food choices  -   Enjoy your food, but eat less. Fully enjoy your food when eating. Don’t eat while distracted and slow down. Avoid over sized portions. Don’t eat while when you’re bored.      EAT THESE FOODS MORE OFTEN: EAT T